# Patient Record
Sex: FEMALE | URBAN - METROPOLITAN AREA
[De-identification: names, ages, dates, MRNs, and addresses within clinical notes are randomized per-mention and may not be internally consistent; named-entity substitution may affect disease eponyms.]

---

## 2018-07-17 ENCOUNTER — OFFICE VISIT (OUTPATIENT)
Dept: URBAN - METROPOLITAN AREA CLINIC 33 | Facility: CLINIC | Age: 28
End: 2018-07-17
Payer: COMMERCIAL

## 2018-07-17 DIAGNOSIS — H52.223 REGULAR ASTIGMATISM, BILATERAL: Primary | ICD-10-CM

## 2018-07-17 PROCEDURE — 92004 COMPRE OPH EXAM NEW PT 1/>: CPT | Performed by: OPTOMETRIST

## 2018-07-17 PROCEDURE — 92015 DETERMINE REFRACTIVE STATE: CPT | Performed by: OPTOMETRIST

## 2018-07-17 ASSESSMENT — INTRAOCULAR PRESSURE
OD: 13
OS: 13

## 2018-07-17 ASSESSMENT — VISUAL ACUITY
OS: 20/20
OD: 20/20

## 2018-07-17 NOTE — IMPRESSION/PLAN
Impression: Regular astigmatism, bilateral: H52.223. Plan: Educated patient about today's findings. Order refraction to determine patient's best corrected visual acuity as it relates to astigmatism- dispensed Rx to patient. Patient was educated about 1-2 week adaptation period with new Rx.

## 2018-11-06 ENCOUNTER — OFFICE VISIT (OUTPATIENT)
Dept: URBAN - METROPOLITAN AREA CLINIC 33 | Facility: CLINIC | Age: 28
End: 2018-11-06
Payer: COMMERCIAL

## 2018-11-06 DIAGNOSIS — B30.8 OTHER VIRAL CONJUNCTIVITIS: Primary | ICD-10-CM

## 2018-11-06 PROCEDURE — 99213 OFFICE O/P EST LOW 20 MIN: CPT | Performed by: OPTOMETRIST

## 2018-11-06 RX ORDER — FLUOROMETHOLONE 1 MG/ML
0.1 % SUSPENSION/ DROPS OPHTHALMIC
Qty: 5 | Refills: 1 | Status: ACTIVE
Start: 2018-11-06

## 2018-11-06 ASSESSMENT — INTRAOCULAR PRESSURE
OS: 11
OD: 12

## 2018-11-06 NOTE — IMPRESSION/PLAN
Impression: Other viral conjunctivitis: B30.8. Plan: Patient is diagnosed with viral conjunctivitis and understands the contagious nature of the condition typically lasts 7-10 days from onset. During the contagious period it has been recommended that the patient wash hands often, avoid cross-contamination, minimize contact with others, and wash bedroom linens and bathroom towels. AT's may be used often for comfort in addition to a topical steroid. Rx'd FML BID OU. Discussed punctal occlusion due to nursing.